# Patient Record
Sex: FEMALE | Race: WHITE | NOT HISPANIC OR LATINO | URBAN - NONMETROPOLITAN AREA
[De-identification: names, ages, dates, MRNs, and addresses within clinical notes are randomized per-mention and may not be internally consistent; named-entity substitution may affect disease eponyms.]

---

## 2018-06-05 PROBLEM — H50.55: Noted: 2018-06-05

## 2019-06-26 ENCOUNTER — IMPORTED ENCOUNTER (OUTPATIENT)
Dept: URBAN - NONMETROPOLITAN AREA CLINIC 1 | Facility: CLINIC | Age: 84
End: 2019-06-26

## 2019-06-26 PROCEDURE — S0621 ROUTINE OPHTHALMOLOGICAL EXA: HCPCS

## 2019-06-26 NOTE — PATIENT DISCUSSION
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 -	  Benefits and drawbacks of the various types of lenses were discussed including bifocal trifocal and progressive lenses. BELL'S PLSY BY HX STABLE TODAYDECOMPENSATING PHORIAPT ELECTS AGAINST PRISM AT THIS TIMEstable today; 's Notes: RECC TORIC OD  LRI OSand LenSX laser cat sx if pt desiresDr Lavell Stokes pt"

## 2021-01-18 ENCOUNTER — IMPORTED ENCOUNTER (OUTPATIENT)
Dept: URBAN - NONMETROPOLITAN AREA CLINIC 1 | Facility: CLINIC | Age: 86
End: 2021-01-18

## 2021-01-18 PROCEDURE — 92014 COMPRE OPH EXAM EST PT 1/>: CPT

## 2021-01-18 NOTE — PATIENT DISCUSSION
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

## 2022-04-10 ASSESSMENT — TONOMETRY
OD_IOP_MMHG: 15
OD_IOP_MMHG: 15
OS_IOP_MMHG: 14
OS_IOP_MMHG: 15

## 2022-04-10 ASSESSMENT — VISUAL ACUITY
OD_SC: 20/60
OS_CC: 20/30+
OS_SC: 20/40
OS_SC: 20/40
OD_SC: 20/60
OU_CC: 20/40
OD_CC: 20/20

## 2022-05-20 ENCOUNTER — ESTABLISHED PATIENT (OUTPATIENT)
Dept: RURAL CLINIC 2 | Facility: CLINIC | Age: 87
End: 2022-05-20

## 2022-05-20 DIAGNOSIS — H52.4: ICD-10-CM

## 2022-05-20 DIAGNOSIS — H26.492: ICD-10-CM

## 2022-05-20 DIAGNOSIS — H35.3132: ICD-10-CM

## 2022-05-20 DIAGNOSIS — H16.223: ICD-10-CM

## 2022-05-20 DIAGNOSIS — Z96.1: ICD-10-CM

## 2022-05-20 DIAGNOSIS — H43.813: ICD-10-CM

## 2022-05-20 PROCEDURE — 92015 DETERMINE REFRACTIVE STATE: CPT

## 2022-05-20 PROCEDURE — 92014 COMPRE OPH EXAM EST PT 1/>: CPT

## 2022-05-20 ASSESSMENT — VISUAL ACUITY
OD_CC: 20/50+3
OS_CC: 20/40

## 2022-05-20 ASSESSMENT — TONOMETRY
OD_IOP_MMHG: 17
OS_IOP_MMHG: 15